# Patient Record
Sex: FEMALE | Race: WHITE | Employment: FULL TIME | URBAN - METROPOLITAN AREA
[De-identification: names, ages, dates, MRNs, and addresses within clinical notes are randomized per-mention and may not be internally consistent; named-entity substitution may affect disease eponyms.]

---

## 2017-05-22 ENCOUNTER — HOSPITAL ENCOUNTER (OUTPATIENT)
Dept: MAMMOGRAPHY | Age: 52
Discharge: HOME | End: 2017-05-22
Attending: OBSTETRICS & GYNECOLOGY | Admitting: OBSTETRICS & GYNECOLOGY

## 2017-05-22 DIAGNOSIS — Z12.31 ENCOUNTER FOR SCREENING MAMMOGRAM FOR BREAST CANCER: ICD-10-CM

## 2022-05-04 ENCOUNTER — TELEPHONE (OUTPATIENT)
Dept: SURGERY | Age: 57
End: 2022-05-04

## 2022-05-04 NOTE — TELEPHONE ENCOUNTER
Tried to call this patient for a New Patient ash at [phone] says a name that is no one on her contact list the other number [phone] number states the mail box is full and you can not leave a message. I will try to attempt to call Dr. Sonu Almendarez tomorrow and see if they have any other numbers.

## 2022-05-04 NOTE — TELEPHONE ENCOUNTER
I actually found a number that worked for the patient and I hope she gives us a call back by tomorrow if she cant for some reason I will call her again.

## 2022-05-05 NOTE — PROGRESS NOTES
Fredi Pillai MD  Surgical Oncology  Date of service: 5/10/2022  Augusto Rdz    Reason for Consult: Dr Christel Joseph asked me to see Milan Villanueva for evaluation of melanoma in situ of the left breast.    Present Illness: Patient is a 65 y.o.  female presents to her dermatologist with a mole on the left breast. The lesion was very small in size, smaller than the tip of a pencil, and has been present for at least two years. It has recently not significantly changed in size. It has not been bleeding. The lesion has not been pruritic. Patient does not have any other lesions of concern. Biopsy of the lesion was positive for melanoma. Patient does have a personal  history of melanoma (lower limb, including hip ([address] 2002) and Right Leg; feb 2019 melanoma in-situ Right posterior calf. Beka Burger does not have significant subcutaneous mass, swelling in the neck or axilla, cough, abdominal pain, jaundice, blood in stools, headaches, recent neurological changes or bone pain to indicate any metastatic disease. Mother had Ovarian Cancer. Father had possible Lymphoma, Aunt had bone cancer. Past Medical History:   Diagnosis Date    Anxiety      History reviewed. No pertinent surgical history. Social  Social History     Tobacco Use    Smoking status: Never Smoker    Smokeless tobacco: Never Used   Substance Use Topics    Alcohol use: Yes     Comment: social    Drug use: Never     History reviewed. No pertinent family history. Allergies: Patient has no known allergies. Current Outpatient Medications   Medication Sig Dispense Refill    fluticasone (FLONASE) 50 MCG/ACT nasal spray 1 spray by Nasal route daily. No current facility-administered medications for this visit. Review of Systems: See HPI, all other systems reviewed and are negative.   Physical Examination:  /81   Pulse 62   Temp 98.2 °F (36.8 °C)   Ht 5' 8\" (1.727 m)   Wt 144 lb (65.3 kg)   LMP  (LMP Unknown)   Breastfeeding No BMI 21.90 kg/m²   General:  Alert, oriented x 3, cooperative, no distress, appears stated age. Skin: Skin color, texture, turgor normal. There is a biopsy site on her Left Breast. There are no surrounding areas of nodularity or pigmentation. Other suspicious skin lesions: no.   Lymph nodes: Cervical, supraclavicular, and axillary nodes normal.   HENT:  Normocephalic, without obvious abnormality. Moist mucus membranes. No icterus. Neck: Supple, symmetrical, trachea midline, no thyromegaly. Back:   Symmetric, no curvature. No CVA tenderness. Lungs:   No respiratory distress. Chest wall:  No tenderness or deformity. Heart:  Regular rate and rhythm by peripheral pulses. Abdomen:   Soft, non-tender. No masses,  No organomegaly. Extremities: Extremities normal, atraumatic, no cyanosis or edema. Neurologic: Grossly intact sensory and motor exam.   Psychiatric: Appropriate mood and thought process     Pathology: Early Evolving Malignant Melanoma In Situ Of The Left Breast  Plan: Pathology reviewed with the patient. The natural history, prognosis and treatment of melanoma were discussed. Dorita Hankins appears to have melanoma in situ. We discussed wide local excision and sentinel node biopsy as well as the appropriate applications. These discussions were illustrated with diagrams and the patient was given a printed handout that summarizes the complete discussion. The lesion can be treated with: Wide Excision under MAC/Local anesthesia. I explained patient about the surgery. Discussed pros and cons of proceeding with excision in the office versus in the hospital. Given the location, it may be a better idea to proceed with OR. All the complications including wound issues were explained. Risks, benefits and alternatives of surgery explained to the patient and patient wishes to proceed with surgery. Adjuvant management will depend on final pathology.  All the questions of the patient are answered to her apparent satisfaction. Patient verbalized understanding of the management plan. Recommend proceeding with excision after the biopsy site has fully healed. Explained expected course of recovery and appropriate post-op activities/restrictions. Recommend allowing site to heal and then proceeding with surgery sometime in the next 4-6 weeks. Discussed importance of continued monitoring of self and routine follow up with Dr. Deondre Maceod. IAntonio DNP, ALEXANDRE, am scribing for an in the presence of Dr. David Drummond. 5/10/2022, 1:10 PM     I, Dr. David Drummond, personally performed the services described in this documentation as scribed by Antonio Hand DNP, ALEXANDRE, in my presence, and it is both accurate and complete.       David Drummond MD  Surgery Attending

## 2022-05-10 ENCOUNTER — OFFICE VISIT (OUTPATIENT)
Dept: SURGERY | Age: 57
End: 2022-05-10
Payer: COMMERCIAL

## 2022-05-10 VITALS
WEIGHT: 144 LBS | DIASTOLIC BLOOD PRESSURE: 81 MMHG | SYSTOLIC BLOOD PRESSURE: 125 MMHG | TEMPERATURE: 98.2 F | BODY MASS INDEX: 21.82 KG/M2 | HEART RATE: 62 BPM | HEIGHT: 68 IN

## 2022-05-10 DIAGNOSIS — D03.52 MELANOMA IN SITU OF LEFT BREAST (HCC): Primary | ICD-10-CM

## 2022-05-10 PROCEDURE — 99203 OFFICE O/P NEW LOW 30 MIN: CPT | Performed by: SURGERY

## 2022-05-10 NOTE — LETTER
[facility]) Surgical Oncology and General Surgery   [address] [place] [address] [phone]   [phone] MD Mart    SURGERY ORDER -- 22      Facility:  [facility] # _________________                                  Scheduled by: ____________    Surgery Date & Time: Wednesday May 25th 2:45                                            Nuc Med / Inj. - or - Needle/Seed Loc:                    Pt arrival: 12:25     Patient Name: Aide Emmanuel             :                1965           PCP:                 Torin Newsome MD   Home Ph:         [phone] (home)                                                     PROCEDURE:  Wide excision of left breast melanoma: 48644    DIAGNOSIS:     ICD-10-CM    1. Melanoma in situ of left breast ([facility]  D03.52      Anesthesia: _MAC_ Time Needed: _45 mins_Pt Position: _supine_         Outpatient _Y_ Admit __  Assist._____  Pre-Op H & P to be done by: DOS      Labs Needed: CBC [] PT/PTT []  INR [] CMP [] EKG [] CXR [] Urine Hcg []     Cardiac Clearance ___  (if Shagufta Jeny to be done by) __________________    Medications to be stopped 5 days before surgery:  None    Additional/Special Orders:    Ancef 2gm IV satinder Garcia MD  2022 12:15 PM

## 2022-05-17 ENCOUNTER — TELEPHONE (OUTPATIENT)
Dept: SURGERY | Age: 57
End: 2022-05-17

## 2022-05-17 NOTE — TELEPHONE ENCOUNTER
Patient called in wanting to go over dates for her procedure    Please contact the patient at [phone]

## 2022-05-18 NOTE — TELEPHONE ENCOUNTER
Will discuss potential surgery dates with Dr. Zoraida Fraire and inform patient. At last appointment discussed potentially the 23rd.

## 2022-05-19 ENCOUNTER — PREP FOR PROCEDURE (OUTPATIENT)
Dept: SURGERY | Age: 57
End: 2022-05-19

## 2022-05-20 ENCOUNTER — TELEPHONE (OUTPATIENT)
Dept: SURGERY | Age: 57
End: 2022-05-20

## 2022-05-20 NOTE — TELEPHONE ENCOUNTER
Patient verbalized an understanding od date, Wednesday May 2th, with an arrival time of 12:30 and all pre-op instructions.

## 2022-05-23 NOTE — PROGRESS NOTES
Place patient label inside box (if no patient label, complete below)  Name:  :  MR#:   Niesha Angles / PROCEDURE  1. I (we), Zully Blackman (Patient Name) authorize Nell Navarro MD (Provider / Mary Ellen Patterson) and/or such assistants as may be selected by him/her, to perform the following operation/procedure(s): WIDE EXCISION OF LEFT BREAST MELANOMA        Note: If unable to obtain consent prior to an emergent procedure, document the emergent reason in the medical record. This procedure has been explained to my (our) satisfaction and included in the explanation was:  A) The intended benefit, nature, and extent of the procedure to be performed;  B) The significant risks involved and the probability of success;  C) Alternative procedures and methods of treatment;  D) The dangers and probable consequences of such alternatives (including no procedure or treatment); E) The expected consequences of the procedure on my future health;  F) Whether other qualified individuals would be performing important surgical tasks and/or whether  would be present to advise or support the procedure. I (we) understand that there are other risks of infection and other serious complications in the pre-operative/procedural and postoperative/procedural stages of my (our) care. I (we) have asked all of the questions which I (we) thought were important in deciding whether or not to undergo treatment or diagnosis. These questions have been answered to my (our) satisfaction. I (we) understand that no assurance can be given that the procedure will be a success, and no guarantee or warranty of success has been given to me (us).     2. It has been explained to me (us) that during the course of the operation/procedure, unforeseen conditions may be revealed that necessitate extension of the original procedure(s) or different procedure(s) than those set forth in Paragraph 1. I (we) authorize and request that the above-named physician, his/her assistants or his/her designees, perform procedures as necessary and desirable if deemed to be in my (our) best interest.     Revised 8/2/2021                                                                          Page 1 of 2                    3. I acknowledge that health care personnel may be observing this procedure for the purpose of medical education or other specified purposes as may be necessary as requested and/or approved by my (our) physician. 4. I (we) consent to the disposal by the hospital Pathologist of the removed tissue, parts or organs in accordance with hospital policy. 5. I do ____ do not ____ consent to the use of a local infiltration pain blocking agent that will be used by my provider/surgical provider to help alleviate pain during my procedure. 6. I do ____ do not ____ consent to an emergent blood transfusion in the case of a life-threatening situation that requires blood components to be administered. This consent is valid for 24 hours from the beginning of the procedure. 7. This patient does ____ or does not ____ currently have a DNR status/order. If DNR order is in place, obtain Addendum to the Surgical Consent for ALL Patients with a DNR Order to address cameron-operative status for limited intervention or DNR suspension.      8. I have read and fully understand the above Consent for Operation/Procedure and that all blanks were completed before I signed the consent.   _____________________________       _____________________      ____/____am/pm  Signature of Patient or legal representative      Printed Name / Relationship            Date / Time   ____________________________       _____________________      ____/____am/pm  Witness to Signature                                    Printed Name                    Date / Time     If patient is unable to sign or is a minor, complete the following)  Patient is a minor, ____ years of age, or unable to sign because:   ______________________________________________________________________________________________    Marie Herrmann If a phone consent is obtained, consent will be documented by using two health care professionals, each affirming that the consenting party has no questions and gives consent for the procedure discussed with the physician/provider.   _____________________          ____________________       _____/_____am/pm   2nd witness to phone consent        Printed name           Date / Time    Informed Consent:  I have provided the explanation described above in section 1 to the patient and/or legal representative.  I have provided the patient and/or legal representative with an opportunity to ask any questions about the proposed operation/procedure.   ___________________________          ____________________         ____/____am/pm  Provider / Proceduralist                            Printed name            Date / Time  Revised 8/2/2021                                                                      Page 2 of 2

## 2022-05-23 NOTE — PROGRESS NOTES
[facility] PRE-SURGICAL TESTING INSTRUCTIONS                                  PRIOR TO PROCEDURE DATE:        1. PLEASE FOLLOW ANY  GUIDELINES/ INSTRUCTIONS PRIOR TO YOUR PROCEDURE AS ADVISED BY YOUR SURGEON. 2. Arrange for someone to drive you home and be with you for the first 24 hours after discharge for your safety after your procedure for which you received sedation. Ensure it is someone we can share information with regarding your discharge. 3. You must contact your surgeon for instructions IF:   You are taking any blood thinners, aspirin, anti-inflammatory or vitamin E.   There is a change in your physical condition such as a cold, fever, rash, cuts, sores or any other infection, especially near your surgical site. 4. Do not drink alcohol the day before or day of your procedure. 5. A Pre-op History and Physical for surgery MUST be completed by your Physician or Urgent Care within 30 days of your procedure date. Please bring a copy with you on the day of your procedure and along with any other testing performed. THE DAY OF YOUR PROCEDURE:  1. Follow instructions for ARRIVAL TIME as DIRECTED BY YOUR SURGEON. 2. Enter the MAIN entrance from [employer] and follow the signs to the free [employer] or [employer] parking (offered free of charge 6am-5pm). 3. Enter the Main Entrance of the hospital (do not enter from the lower level of the parking garage). Upon entrance, check in with the  at the main desk on your left. If no one is available at the desk, proceed into the [facility] Waiting Room and go through the door directly into the [facility]. There is a Check-in desk ACROSS from Room 5 (marked with a sign hanging from the ceiling). The phone number for the surgery center is [phone]. 4. Please call [phone] option #2 option #2 if you have not been preregistered yet.   On the day of your procedure bring your insurance card and photo ID. You will be registered at your bedside once brought back to your room. 5. DO NOT EAT ANYTHING eight hours prior to your arrival for surgery. May have 8 ounces of water 4 hours prior to your arrival for surgery. NOTE: ALL Gastric, Bariatric and Bowel surgery patients MUST follow their surgeon's instructions. 6. MEDICATIONS    Take the following medications with a SMALL sip of water: NONE   Bariatric patient's call surgeon if on diabetic medications as some need to be stopped 1 week preop   Use your usual dose of inhalers the morning of surgery. BRING your rescue inhaler with you to hospital.    Anesthesia does NOT want you to take insulin the morning of surgery. They will control your blood sugar while you are at the hospital. Please contact your ordering physician for instructions regarding your insulin the night before your procedure. If you have an insulin pump, please keep it set on basal rate. 7. Do not swallow water when brushing teeth. No gum, candy, mints or ice chips. Refrain from smoking or at least decrease the amount. 8. Dress in loose, comfortable clothing appropriate for redressing after your procedure. Do not wear jewelry (including body piercings), make-up (especially NO eye make-up), fingernail polish (NO toenail polish if foot/leg surgery), lotion, powders or metal hairclips. 9. Dentures, glasses, or contacts will need to be removed before your procedure. Bring cases for your glasses, contacts, dentures, or hearing aids to protect them while you are in surgery. 10. If you use a CPAP, please bring it with you on the day of your procedure. 11. We recommend that valuable personal  belongings such as cash, cell phones, e-tablets or jewelry, be left at home during your stay. The hospital will not be responsible for valuables that are not secured in the hospital safe.  However, if your insurance requires a co-pay, you may want to bring a method of payment, i.e. Check or credit card, if you wish to pay your co-pay the day of surgery. 12. If you are to stay overnight, you may bring a bag with personal items. Please have any large items you may need brought in by your family after your arrival to your hospital room. 15. If you have a Living Will or Durable Power of , please bring a copy on the day of your procedure. 15. With your permission, one family member may accompany you while you are being prepared for surgery. Once you are ready, additional family members may join you. HOW WE KEEP YOU SAFE and WORK TO PREVENT SURGICAL SITE INFECTIONS:  1. Health care workers should always check your ID bracelet to verify your name and birth date. You will be asked many times to state your name, date of birth, and allergies. 2. Health care workers should always clean their hands with soap or alcohol gel before providing care to you. It is okay to ask anyone if they cleaned their hands before they touch you. 3. You will be actively involved in verifying the type of procedure you are having and ensuring the correct surgical site. This will be confirmed multiple times prior to your procedure. Do NOT dwayne your surgery site UNLESS instructed to by your surgeon. 4. Do not shave or wax for 72 hours prior to procedure near your operative site. Shaving with a razor can irritate your skin and make it easier to develop an infection. On the day of your procedure, any hair that needs to be removed near the surgical site will be clipped by a healthcare worker using a special clippers designed to avoid skin irritation. 5. When you are in the operating room, your surgical site will be cleansed with a special soap, and in most cases, you will be given an antibiotic before the surgery begins. What to expect AFTER YOUR PROCEDURE:  1. Immediately following your procedure, your will be taken to the PACU for the first phase of your recovery.   Your nurse will help you recover from any potential side effects of anesthesia, such as extreme drowsiness, changes in your vital signs or breathing patterns. Nausea, headache, muscle aches, or sore throat may also occur after anesthesia. Your nurse will help you manage these potential side effects. 2. For comfort and safety, arrange to have someone at home with you for the first 24 hours after discharge. 3. You and your family will be given written instructions about your diet, activity, dressing care, medications, and return visits. 4. Once at home, should issues with nausea, pain, or bleeding occur, or should you notice any signs of infection, you should call your surgeon. 5. Always clean your hands before and after caring for your wound. Do not let your family touch your surgery site without cleaning their hands. 6. Narcotic pain medications can cause significant constipation. You may want to add a stool softener to your postoperative medication schedule or speak to your surgeon on how best to manage this SIDE EFFECT. SPECIAL INSTRUCTIONS None    Thank you for allowing us to care for you. We strive to exceed your expectations in the delivery of care and service provided to you and your family. If you need to contact the [place] [address] staff for any reason, please call us at [phone]    Instructions reviewed with patient during preadmission testing phone interview.   Sim العلي RN.5/23/2022 .12:29 PM      ADDITIONAL EDUCATIONAL INFORMATION REVIEWED PER PHONE WITH YOU AND/OR YOUR FAMILY:  Yes Hibiclens® Bathing Instructions   Yes Antibacterial Soap

## 2022-05-25 ENCOUNTER — ANESTHESIA EVENT (OUTPATIENT)
Dept: OPERATING ROOM | Age: 57
End: 2022-05-25
Payer: COMMERCIAL

## 2022-05-25 ENCOUNTER — ANESTHESIA (OUTPATIENT)
Dept: OPERATING ROOM | Age: 57
End: 2022-05-25
Payer: COMMERCIAL

## 2022-05-25 ENCOUNTER — HOSPITAL ENCOUNTER (OUTPATIENT)
Age: 57
Setting detail: OUTPATIENT SURGERY
Discharge: HOME | End: 2022-05-25
Attending: SURGERY | Admitting: SURGERY
Payer: COMMERCIAL

## 2022-05-25 VITALS
OXYGEN SATURATION: 99 % | HEIGHT: 68 IN | BODY MASS INDEX: 21.19 KG/M2 | HEART RATE: 54 BPM | DIASTOLIC BLOOD PRESSURE: 74 MMHG | WEIGHT: 139.8 LBS | TEMPERATURE: 98.1 F | SYSTOLIC BLOOD PRESSURE: 124 MMHG | RESPIRATION RATE: 13 BRPM

## 2022-05-25 DIAGNOSIS — D03.52 MELANOMA IN SITU OF LEFT BREAST (HCC): ICD-10-CM

## 2022-05-25 PROCEDURE — 6360000002 HC RX W HCPCS: Performed by: NURSE ANESTHETIST, CERTIFIED REGISTERED

## 2022-05-25 PROCEDURE — 2500000003 HC RX 250 WO HCPCS: Performed by: NURSE ANESTHETIST, CERTIFIED REGISTERED

## 2022-05-25 PROCEDURE — 13101 CMPLX RPR TRUNK 2.6-7.5 CM: CPT | Performed by: SURGERY

## 2022-05-25 PROCEDURE — 2580000003 HC RX 258: Performed by: NURSE PRACTITIONER

## 2022-05-25 PROCEDURE — 88305 TISSUE EXAM BY PATHOLOGIST: CPT

## 2022-05-25 PROCEDURE — 2580000003 HC RX 258: Performed by: ANESTHESIOLOGY

## 2022-05-25 PROCEDURE — 7100000000 HC PACU RECOVERY - FIRST 15 MIN: Performed by: SURGERY

## 2022-05-25 PROCEDURE — 3600000002 HC SURGERY LEVEL 2 BASE: Performed by: SURGERY

## 2022-05-25 PROCEDURE — 3700000000 HC ANESTHESIA ATTENDED CARE: Performed by: SURGERY

## 2022-05-25 PROCEDURE — 6360000002 HC RX W HCPCS: Performed by: NURSE PRACTITIONER

## 2022-05-25 PROCEDURE — 6370000000 HC RX 637 (ALT 250 FOR IP): Performed by: FAMILY MEDICINE

## 2022-05-25 PROCEDURE — 3600000012 HC SURGERY LEVEL 2 ADDTL 15MIN: Performed by: SURGERY

## 2022-05-25 PROCEDURE — 7100000011 HC PHASE II RECOVERY - ADDTL 15 MIN: Performed by: SURGERY

## 2022-05-25 PROCEDURE — 11602 EXC TR-EXT MAL+MARG 1.1-2 CM: CPT | Performed by: SURGERY

## 2022-05-25 PROCEDURE — 3700000001 HC ADD 15 MINUTES (ANESTHESIA): Performed by: SURGERY

## 2022-05-25 PROCEDURE — 2580000003 HC RX 258: Performed by: SURGERY

## 2022-05-25 PROCEDURE — A4217 STERILE WATER/SALINE, 500 ML: HCPCS | Performed by: SURGERY

## 2022-05-25 PROCEDURE — 2709999900 HC NON-CHARGEABLE SUPPLY: Performed by: SURGERY

## 2022-05-25 PROCEDURE — 2500000003 HC RX 250 WO HCPCS: Performed by: SURGERY

## 2022-05-25 PROCEDURE — 7100000010 HC PHASE II RECOVERY - FIRST 15 MIN: Performed by: SURGERY

## 2022-05-25 PROCEDURE — 7100000001 HC PACU RECOVERY - ADDTL 15 MIN: Performed by: SURGERY

## 2022-05-25 RX ORDER — KETOROLAC TROMETHAMINE 30 MG/ML
INJECTION, SOLUTION INTRAMUSCULAR; INTRAVENOUS PRN
Status: DISCONTINUED | OUTPATIENT
Start: 2022-05-25 | End: 2022-05-25 | Stop reason: SDUPTHER

## 2022-05-25 RX ORDER — SODIUM CHLORIDE 9 MG/ML
25 INJECTION, SOLUTION INTRAVENOUS PRN
Status: DISCONTINUED | OUTPATIENT
Start: 2022-05-25 | End: 2022-05-25 | Stop reason: HOSPADM

## 2022-05-25 RX ORDER — ONDANSETRON 2 MG/ML
INJECTION INTRAMUSCULAR; INTRAVENOUS PRN
Status: DISCONTINUED | OUTPATIENT
Start: 2022-05-25 | End: 2022-05-25 | Stop reason: SDUPTHER

## 2022-05-25 RX ORDER — KETAMINE HYDROCHLORIDE 50 MG/ML
INJECTION, SOLUTION, CONCENTRATE INTRAMUSCULAR; INTRAVENOUS PRN
Status: DISCONTINUED | OUTPATIENT
Start: 2022-05-25 | End: 2022-05-25 | Stop reason: SDUPTHER

## 2022-05-25 RX ORDER — FENTANYL CITRATE 50 UG/ML
INJECTION, SOLUTION INTRAMUSCULAR; INTRAVENOUS PRN
Status: DISCONTINUED | OUTPATIENT
Start: 2022-05-25 | End: 2022-05-25 | Stop reason: SDUPTHER

## 2022-05-25 RX ORDER — DIPHENHYDRAMINE HYDROCHLORIDE 50 MG/ML
12.5 INJECTION INTRAMUSCULAR; INTRAVENOUS
Status: DISCONTINUED | OUTPATIENT
Start: 2022-05-25 | End: 2022-05-25 | Stop reason: HOSPADM

## 2022-05-25 RX ORDER — SODIUM CHLORIDE 0.9 % (FLUSH) 0.9 %
5-40 SYRINGE (ML) INJECTION EVERY 12 HOURS SCHEDULED
Status: DISCONTINUED | OUTPATIENT
Start: 2022-05-25 | End: 2022-05-25 | Stop reason: HOSPADM

## 2022-05-25 RX ORDER — LIDOCAINE HYDROCHLORIDE 10 MG/ML
1 INJECTION, SOLUTION EPIDURAL; INFILTRATION; INTRACAUDAL; PERINEURAL
Status: DISCONTINUED | OUTPATIENT
Start: 2022-05-25 | End: 2022-05-25 | Stop reason: HOSPADM

## 2022-05-25 RX ORDER — PROCHLORPERAZINE EDISYLATE 5 MG/ML
5 INJECTION INTRAMUSCULAR; INTRAVENOUS
Status: DISCONTINUED | OUTPATIENT
Start: 2022-05-25 | End: 2022-05-25 | Stop reason: HOSPADM

## 2022-05-25 RX ORDER — PROPOFOL 10 MG/ML
INJECTION, EMULSION INTRAVENOUS CONTINUOUS PRN
Status: DISCONTINUED | OUTPATIENT
Start: 2022-05-25 | End: 2022-05-25 | Stop reason: SDUPTHER

## 2022-05-25 RX ORDER — SODIUM CHLORIDE 0.9 % (FLUSH) 0.9 %
5-40 SYRINGE (ML) INJECTION PRN
Status: DISCONTINUED | OUTPATIENT
Start: 2022-05-25 | End: 2022-05-25 | Stop reason: HOSPADM

## 2022-05-25 RX ORDER — ONDANSETRON 2 MG/ML
4 INJECTION INTRAMUSCULAR; INTRAVENOUS
Status: DISCONTINUED | OUTPATIENT
Start: 2022-05-25 | End: 2022-05-25 | Stop reason: HOSPADM

## 2022-05-25 RX ORDER — SODIUM CHLORIDE, SODIUM LACTATE, POTASSIUM CHLORIDE, CALCIUM CHLORIDE 600; 310; 30; 20 MG/100ML; MG/100ML; MG/100ML; MG/100ML
INJECTION, SOLUTION INTRAVENOUS CONTINUOUS
Status: DISCONTINUED | OUTPATIENT
Start: 2022-05-25 | End: 2022-05-25 | Stop reason: HOSPADM

## 2022-05-25 RX ORDER — LORAZEPAM 2 MG/ML
0.5 INJECTION INTRAMUSCULAR
Status: DISCONTINUED | OUTPATIENT
Start: 2022-05-25 | End: 2022-05-25 | Stop reason: HOSPADM

## 2022-05-25 RX ORDER — MIDAZOLAM HYDROCHLORIDE 1 MG/ML
INJECTION INTRAMUSCULAR; INTRAVENOUS PRN
Status: DISCONTINUED | OUTPATIENT
Start: 2022-05-25 | End: 2022-05-25 | Stop reason: SDUPTHER

## 2022-05-25 RX ORDER — LABETALOL HYDROCHLORIDE 5 MG/ML
10 INJECTION, SOLUTION INTRAVENOUS
Status: DISCONTINUED | OUTPATIENT
Start: 2022-05-25 | End: 2022-05-25 | Stop reason: HOSPADM

## 2022-05-25 RX ORDER — OXYCODONE HYDROCHLORIDE 5 MG/1
5 TABLET ORAL PRN
Status: COMPLETED | OUTPATIENT
Start: 2022-05-25 | End: 2022-05-25

## 2022-05-25 RX ORDER — BUPIVACAINE HYDROCHLORIDE AND EPINEPHRINE 5; 5 MG/ML; UG/ML
INJECTION, SOLUTION EPIDURAL; INTRACAUDAL; PERINEURAL PRN
Status: DISCONTINUED | OUTPATIENT
Start: 2022-05-25 | End: 2022-05-25 | Stop reason: HOSPADM

## 2022-05-25 RX ORDER — PROPOFOL 10 MG/ML
INJECTION, EMULSION INTRAVENOUS PRN
Status: DISCONTINUED | OUTPATIENT
Start: 2022-05-25 | End: 2022-05-25 | Stop reason: SDUPTHER

## 2022-05-25 RX ORDER — LIDOCAINE HYDROCHLORIDE 20 MG/ML
INJECTION, SOLUTION INFILTRATION; PERINEURAL PRN
Status: DISCONTINUED | OUTPATIENT
Start: 2022-05-25 | End: 2022-05-25 | Stop reason: SDUPTHER

## 2022-05-25 RX ORDER — SODIUM CHLORIDE 9 MG/ML
INJECTION, SOLUTION INTRAVENOUS CONTINUOUS
Status: DISCONTINUED | OUTPATIENT
Start: 2022-05-25 | End: 2022-05-25 | Stop reason: HOSPADM

## 2022-05-25 RX ORDER — MAGNESIUM HYDROXIDE 1200 MG/15ML
LIQUID ORAL CONTINUOUS PRN
Status: DISCONTINUED | OUTPATIENT
Start: 2022-05-25 | End: 2022-05-25 | Stop reason: HOSPADM

## 2022-05-25 RX ORDER — OXYCODONE HYDROCHLORIDE 5 MG/1
10 TABLET ORAL PRN
Status: COMPLETED | OUTPATIENT
Start: 2022-05-25 | End: 2022-05-25

## 2022-05-25 RX ORDER — FENTANYL CITRATE 50 UG/ML
25 INJECTION, SOLUTION INTRAMUSCULAR; INTRAVENOUS EVERY 5 MIN PRN
Status: DISCONTINUED | OUTPATIENT
Start: 2022-05-25 | End: 2022-05-25 | Stop reason: HOSPADM

## 2022-05-25 RX ORDER — SODIUM CHLORIDE 9 MG/ML
INJECTION, SOLUTION INTRAVENOUS PRN
Status: DISCONTINUED | OUTPATIENT
Start: 2022-05-25 | End: 2022-05-25 | Stop reason: HOSPADM

## 2022-05-25 RX ORDER — OXYCODONE HYDROCHLORIDE 5 MG/1
5 TABLET ORAL EVERY 6 HOURS PRN
Qty: 20 TABLET | Refills: 0 | Status: SHIPPED | OUTPATIENT
Start: 2022-05-25 | End: 2022-05-27

## 2022-05-25 RX ADMIN — ONDANSETRON 6 MG: 2 INJECTION INTRAMUSCULAR; INTRAVENOUS at 16:24

## 2022-05-25 RX ADMIN — KETAMINE HYDROCHLORIDE 30 MG: 50 INJECTION, SOLUTION INTRAMUSCULAR; INTRAVENOUS at 16:29

## 2022-05-25 RX ADMIN — OXYCODONE 5 MG: 5 TABLET ORAL at 17:53

## 2022-05-25 RX ADMIN — SODIUM CHLORIDE, POTASSIUM CHLORIDE, SODIUM LACTATE AND CALCIUM CHLORIDE: 600; 310; 30; 20 INJECTION, SOLUTION INTRAVENOUS at 13:23

## 2022-05-25 RX ADMIN — LIDOCAINE HYDROCHLORIDE 50 MG: 20 INJECTION, SOLUTION INFILTRATION; PERINEURAL at 16:30

## 2022-05-25 RX ADMIN — PROPOFOL 25 MG: 10 INJECTION, EMULSION INTRAVENOUS at 16:29

## 2022-05-25 RX ADMIN — MIDAZOLAM HYDROCHLORIDE 2 MG: 2 INJECTION, SOLUTION INTRAMUSCULAR; INTRAVENOUS at 16:24

## 2022-05-25 RX ADMIN — KETOROLAC TROMETHAMINE 30 MG: 30 INJECTION, SOLUTION INTRAMUSCULAR at 16:22

## 2022-05-25 RX ADMIN — PROPOFOL 150 MCG/KG/MIN: 10 INJECTION, EMULSION INTRAVENOUS at 16:29

## 2022-05-25 RX ADMIN — FENTANYL CITRATE 20 MCG: 50 INJECTION, SOLUTION INTRAMUSCULAR; INTRAVENOUS at 16:33

## 2022-05-25 RX ADMIN — SODIUM CHLORIDE, POTASSIUM CHLORIDE, SODIUM LACTATE AND CALCIUM CHLORIDE: 600; 310; 30; 20 INJECTION, SOLUTION INTRAVENOUS at 16:55

## 2022-05-25 RX ADMIN — CEFAZOLIN 2000 MG: 2 INJECTION, POWDER, FOR SOLUTION INTRAMUSCULAR; INTRAVENOUS at 16:24

## 2022-05-25 RX ADMIN — FENTANYL CITRATE 20 MCG: 50 INJECTION, SOLUTION INTRAMUSCULAR; INTRAVENOUS at 16:35

## 2022-05-25 ASSESSMENT — PAIN SCALES - GENERAL
PAINLEVEL_OUTOF10: 2
PAINLEVEL_OUTOF10: 0
PAINLEVEL_OUTOF10: 2
PAINLEVEL_OUTOF10: 4

## 2022-05-25 ASSESSMENT — PAIN DESCRIPTION - DESCRIPTORS: DESCRIPTORS: SORE

## 2022-05-25 ASSESSMENT — PAIN - FUNCTIONAL ASSESSMENT: PAIN_FUNCTIONAL_ASSESSMENT: 0-10

## 2022-05-25 ASSESSMENT — PAIN DESCRIPTION - ORIENTATION: ORIENTATION: LEFT

## 2022-05-25 ASSESSMENT — PAIN DESCRIPTION - LOCATION: LOCATION: BREAST

## 2022-05-25 ASSESSMENT — PAIN DESCRIPTION - FREQUENCY: FREQUENCY: CONTINUOUS

## 2022-05-25 ASSESSMENT — PAIN DESCRIPTION - PAIN TYPE: TYPE: ACUTE PAIN;SURGICAL PAIN

## 2022-05-25 ASSESSMENT — PAIN DESCRIPTION - ONSET: ONSET: PROGRESSIVE

## 2022-05-25 NOTE — ANESTHESIA PRE PROCEDURE
Department of Anesthesiology  Preprocedure Note       Name:  Re Perez   Age:  65 y.o.  :  1965                                          MRN:  [id number]         Date:  2022      Surgeon: Pravin Buckner):  Lisa Nolan MD    Procedure: Procedure(s):  WIDE EXCISION OF LEFT BREAST MELANOMA    Medications prior to admission:   Prior to Admission medications    Medication Sig Start Date End Date Taking? Authorizing Provider   fluticasone (FLONASE) 50 MCG/ACT nasal spray 1 spray by Nasal route daily. Historical Provider, MD       Current medications:    No current facility-administered medications for this encounter. Allergies:  No Known Allergies    Problem List:  There is no problem list on file for this patient. Past Medical History:        Diagnosis Date    Anxiety        Past Surgical History:        Procedure Laterality Date     SECTION      HERNIA REPAIR         Social History:    Social History     Tobacco Use    Smoking status: Never Smoker    Smokeless tobacco: Never Used   Substance Use Topics    Alcohol use: Yes     Comment: social                                Counseling given: Not Answered      Vital Signs (Current):   Vitals:    22 1226   Weight: 140 lb (63.5 kg)   Height: 5' 8\" (1.727 m)                                              BP Readings from Last 3 Encounters:   05/10/22 125/81       NPO Status:                                                                                 BMI:   Wt Readings from Last 3 Encounters:   22 140 lb (63.5 kg)   05/10/22 144 lb (65.3 kg)     Body mass index is 21.29 kg/m². CBC: No results found for: WBC, RBC, HGB, HCT, MCV, RDW, PLT    CMP: No results found for: NA, K, CL, CO2, BUN, CREATININE, GFRAA, AGRATIO, LABGLOM, GLUCOSE, GLU, PROT, CALCIUM, BILITOT, ALKPHOS, AST, ALT    POC Tests: No results for input(s): POCGLU, POCNA, POCK, POCCL, POCBUN, POCHEMO, POCHCT in the last 72 hours.     Coags: No results found for: PROTIME, INR, APTT    HCG (If Applicable): No results found for: PREGTESTUR, PREGSERUM, HCG, HCGQUANT     ABGs: No results found for: PHART, PO2ART, XGM3AOH, DAU3RES, BEART, P5JYLSLT     Type & Screen (If Applicable):  No results found for: LABABO, LABRH    Drug/Infectious Status (If Applicable):  No results found for: HIV, HEPCAB    COVID-19 Screening (If Applicable): No results found for: COVID19        Anesthesia Evaluation    Airway: Mallampati: II  TM distance: >3 FB   Neck ROM: full  Mouth opening: > = 3 FB   Dental:          Pulmonary:                              Cardiovascular:            Rhythm: regular  Rate: normal                    Neuro/Psych:               GI/Hepatic/Renal:             Endo/Other:    (+) malignancy/cancer. Abdominal:             Vascular: Other Findings:           Anesthesia Plan      MAC     ASA 2       Induction: intravenous. Anesthetic plan and risks discussed with patient. Plan discussed with CRNA.                     Polo Byrnes MD   5/25/2022

## 2022-05-25 NOTE — ANESTHESIA POSTPROCEDURE EVALUATION
Department of Anesthesiology  Postprocedure Note    Patient: Kay Fernando  MRN: [id number]  YOB: 1965  Date of evaluation: 5/25/2022  Time:  7:10 PM     Procedure Summary     Date: 05/25/22 Room / Location: [address] [place] [address]    Anesthesia Start: 2184 Anesthesia Stop: 4251    Procedure: WIDE EXCISION OF LEFT BREAST MELANOMA (Left ) Diagnosis:       Melanoma in situ of left breast ([address]      (Melanoma in situ of left breast ([address] [D03.52])    Surgeons: Bradly Heart MD Responsible Provider: Hunter Gusman MD    Anesthesia Type: MAC ASA Status: 2          Anesthesia Type: No value filed. Ketty Phase I: Ketty Score: 10    Ketty Phase II: Ketty Score: 10    Last vitals: Reviewed and per EMR flowsheets.        Anesthesia Post Evaluation    Patient location during evaluation: PACU  Patient participation: complete - patient participated  Level of consciousness: awake and alert  Airway patency: patent  Nausea & Vomiting: no nausea and no vomiting  Complications: no  Cardiovascular status: hemodynamically stable  Respiratory status: acceptable  Hydration status: euvolemic  Multimodal analgesia pain management approach

## 2022-05-25 NOTE — BRIEF OP NOTE
Brief Postoperative Note      Patient: Ej Manley  YOB: 1965  MRN: [id number]    Date of Procedure: 5/25/2022    Pre-Op Diagnosis: Melanoma in situ of left breast ([address] [D03.52]    Post-Op Diagnosis: Same       Procedure(s):  WIDE EXCISION OF LEFT BREAST MELANOMA    Surgeon(s):  Lew Gilford, MD    Assistant:  Resident: Carmela Hawkins MD    Anesthesia: Monitor Anesthesia Care    Estimated Blood Loss (mL): Minimal    Complications: None    Specimens:   ID Type Source Tests Collected by Time Destination   A : A. LEFT BREAST MELANOMA (SHORT SUPERIOR, LONG MEDIAL) Tissue Tissue SURGICAL PATHOLOGY Lew Gilford, MD 5/25/2022 1646        Implants:  * No implants in log *      Drains: * No LDAs found *    Findings: melanoma in situ of the left breast ~ 2 cm from nipple, 5 o'clock was removed.      Electronically signed by Carmela Hawkins MD on 5/25/2022 at 4:57 PM

## 2022-05-25 NOTE — PROGRESS NOTES
Patient received from the OR to PACU #8 post WIDE EXCISION OF LEFT BREAST MELANOMA - Left of Dr. Maurice Troy. Placed on PACU monitoring equipment. Report given per CRNA and Dr. Christina Millard. Per report, patient was stable during the procedure. On arrival, patient is alert, angela and denies pain.

## 2022-05-25 NOTE — FLOWSHEET NOTE
Tolerating crackers, water and pain pill given. States is \"ready to go home\". Will prepare for discharge.

## 2022-05-25 NOTE — PROGRESS NOTES
Pt anxious but alert and oriented.  at bedside. IV placed and LR running. Ancef on hold at bedside. Consents signed and on chart.  is to take black bag. Clothing locked in locked room.

## 2022-05-25 NOTE — PROGRESS NOTES
Ambulatory Surgery/Procedure Discharge Note    Vitals:    05/25/22 1815   BP: 124/74   Pulse: 54   Resp: 13   Temp: 98.1 °F (36.7 °C)   SpO2: 99%       In: 1500 [P.O.:350; I.V.:1100]  Out: -     Restroom use offered before discharge. Yes, voided large amount into toilet with no issue    Pain assessment:  present - adequately treated, pain pill given prior to discharge  Pain Level: 2    Discharge instructions given to patient and her . They verbalize understanding. Patient discharged to home/self care. Patient discharged via wheel chair by this RN to waiting /private car. Patient's  has all patient belongings, copy of discharge instructions and written prescription.        5/25/2022 6:56 PM

## 2022-05-25 NOTE — H&P
Ej Manley    [id number]      Department of General Surgery    Surgical Services     Pre-operative History and Physical      INDICATION:   Melanoma in situ of left breast ([facility] [address] [D03.52]    Procedure(s):  WIDE EXCISION OF LEFT BREAST MELANOMA      History obtained from: Patient interview and EHR     HISTORY:   The patient is a 65 y.o. female with a past medical and surgical history as delineated below. Patient with biopsy demonstrated melanoma of the left breast presents today for the above procedure. Weight loss/gain:  No  Recent Hx Steroid use: No  Known anesthesia problems:  No  Recent history of abnormal bleeding: No  Remote history of abnormal bleeding:No  Illness Screening: Patient denies fever, chills, worsening cough      Past Medical History:        Diagnosis Date    Anxiety      Past Surgical History:        Procedure Laterality Date     SECTION      HERNIA REPAIR         Medications Prior to Admission:   Prior to Admission medications    Medication Sig Start Date End Date Taking? Authorizing Provider   fluticasone (FLONASE) 50 MCG/ACT nasal spray 1 spray by Nasal route daily. Historical Provider, MD       Allergies:  Patient has no known allergies.     REVIEW OF SYSTEMS:    Constitutional: Negative for chills, fatigue and fever   Respiratory: Negative for shortness of breath and wheezing    Cardiovascular: Negative for chest pain, palpitations and exertional chest pressure  Gastrointestinal: Negative for abdominal pain  Musculoskeletal: Negative for gait problem, and joint swelling    Skin: Negative for rash and nonhealing wounds   Neurological: Negative for dizziness, syncope, light-headedness    Hematological: Does not bruise/bleed easily   Psychiatric/Behavioral: Negative for agitation    PHYSICAL EXAM:      /85   Pulse 87   Temp 98.8 °F (37.1 °C) (Temporal)   Resp 14   Ht 5' 8\" (1.727 m)   Wt 139 lb 12.8 oz (63.4 kg)   LMP  (LMP Unknown)   SpO2 99%   BMI 21.26 kg/m²  I      Eyes:  pupils equal, round and reactive to light and conjunctiva normal    Head/ENT:  Normocephalic, without obvious abnormality, atramatic,     Neck:  Supple, symmetrical, trachea midline, no adenopathy, no tenderness, skin warm and dry. Heart: Regular rate and rhythm    Lungs:  No increased work of breathing, good air exchange, clear to auscultation bilaterally, no crackles or wheezing    Abdomen:  Soft, non-distended, non-tender, no masses palpated    Extremities:  No clubbing, cyanosis, or edema      ASSESSMENT AND PLAN:    1. Patient is a 65 y.o. female with above specified procedure planned. 2.  Access to ancillary services are available per request of the provider.     ALEXANDRE Reyes - CRESENCIO   5/25/2022

## 2022-06-01 NOTE — PROGRESS NOTES
[facility] SURGICAL ONCOLOGY  [address] [facility]  Dept: [phone]  Dept Fax: [phone]      Visit Date: 2022    Subjective: Prisca Burton is a 65 y.o. female here for postoperative visit after wide excision of left breast melanoma on 22. Reports she is feeling well today with no complications or concerns. Pleased with incision. No Known Allergies  Past Medical History:   Diagnosis Date    Anxiety      Past Surgical History:   Procedure Laterality Date     SECTION      CHEST WALL RESECTION Left 2022    WIDE EXCISION OF LEFT BREAST MELANOMA performed by Adry Reeder MD at [facility]         Objective:     /77   Pulse 52   Temp 97.7 °F (36.5 °C)   Ht 5' 8\" (1.727 m)   Wt 143 lb (64.9 kg)   LMP  (LMP Unknown)   BMI 21.74 kg/m²     General:  Alert, oriented x 3, cooperative. Skin: Skin color, texture, turgor normal. Left breast incision with edges well approximated, no erythema, nontender. No drainage. Lymph nodes: Cervical, supraclavicular, and axillary nodes normal.   HENT:  Normocephalic, without obvious abnormality. Moist mucus membranes. No icterus. Lungs: No respiratory distress. Heart:  Regular rate and rhythm. No murmur. Abdomen: Soft, non-tender. No masses,  No organomegaly. Extremities: Extremities normal, atraumatic, no cyanosis or edema. Neurologic: Grossly intact sensory and motor exam.   Psychiatric: Appropriate mood and thought process       Path: FINAL DIAGNOSIS:   Reexcision, left breast melanoma ( melanoma in situ by history ). Organizing skin scar with associated nonspecific reactive and   inflammatory changes. Small seborrheic keratosis. No evidence of residual atypical melanocytic proliferation identified. Resection margins negative for neoplasm. Assessment/Plan:   1. Melanoma in situ left breast: s/p excision 22. Path as above reviewed with patient. Uncomplicated post-operative course. Incision healing well with no issues. Reviewed symptoms of recurrence including nodularity at site. Pt will continue to follow with Dr. Poornima Gregg. Next appointment is in October. Can call us with any questions or concerns if she would like to be seen, otherwise follow-up PRN.          Electronically signed by ALEXANDRE Ramirez CNP on 6/7/2022 at 11:31 AM

## 2022-06-02 NOTE — OP NOTE
Operative Note      Patient: Elliott Boyd  YOB: 1965  MRN: [id number]    Date of Procedure: 5/25/2022    Pre-Op Diagnosis: Melanoma in situ of left breast ([address] [D03.52]    Post-Op Diagnosis: Same       Procedure(s):  WIDE EXCISION OF LEFT BREAST MELANOMA    Surgeon(s):  Charmayne Rusk, MD    Assistant:   Resident: Jennifer Mckeon MD    Anesthesia: Monitor Anesthesia Care    Estimated Blood Loss (mL): 2 ml    Complications: None    Specimens:   ID Type Source Tests Collected by Time Destination   A : A. LEFT BREAST MELANOMA (SHORT SUPERIOR, LONG MEDIAL) Tissue Tissue SURGICAL PATHOLOGY Charmayne Rusk, MD 5/25/2022 1646      INDICATIONS FOR OPERATION:  Discussed with patient about wide excision of the melanoma. The risks of surgery include infection, bleeding, and recurrence of the lesion were explained. Patient verbalized understanding of the risks and benefits and wishes to proceed. DETAILS OF PROCEDURE: Patient was identified in the preoperative area and brought to the operating room. Monitored anesthesia given. Operative site is prepped and draped in a sterile manner. Timeout procedure was performed confirming the procedure, site and administration of antibiotics. Lesion and margin marked for the excision. The lesion was measuring 0.5 cm x 0.4 cm. A margin of 0.5 cm was taken on all sides and it was converted into an eyeball shape to allow for the closure. Local anesthesia infiltrated. Skin incised with scalpel. Incision deepened to subcutaneous tissues. Electrocautery used for further division of tissues. Skin along with subcutaneous fat until fascia excised and oriented with sutures. Specimen sent to pathology. Hemostasis checked and achieved. Because the defect size, flaps are raised on all sides with diathermy. Flaps are raised above the fascia. The undermining of wound was more than the width of the wound.  The wound was closed in layers with interrupted 2-0 Vicryl subcutaneous and 3-0 Vicryl deep dermal sutures. Skin was closed with 3-0 vicryl sutures. Length of complex closure was 4.5 cm. Derma flex placed over the closed incision. The patient tolerated the procedure well. Patient was awakened and transferred to the recovery room uneventfully. I was present for the entire procedure.     Jonh Pizarro MD  Surgical Oncologist

## 2022-06-07 ENCOUNTER — OFFICE VISIT (OUTPATIENT)
Dept: SURGERY | Age: 57
End: 2022-06-07

## 2022-06-07 VITALS
BODY MASS INDEX: 21.67 KG/M2 | TEMPERATURE: 97.7 F | HEART RATE: 52 BPM | WEIGHT: 143 LBS | HEIGHT: 68 IN | DIASTOLIC BLOOD PRESSURE: 77 MMHG | SYSTOLIC BLOOD PRESSURE: 124 MMHG

## 2022-06-07 DIAGNOSIS — Z51.89 VISIT FOR WOUND CHECK: ICD-10-CM

## 2022-06-07 DIAGNOSIS — D03.52 MELANOMA IN SITU OF LEFT BREAST (HCC): Primary | ICD-10-CM

## 2022-06-07 DIAGNOSIS — Z48.89 POSTOPERATIVE VISIT: ICD-10-CM

## 2022-06-07 PROCEDURE — 99024 POSTOP FOLLOW-UP VISIT: CPT | Performed by: NURSE PRACTITIONER

## (undated) DEVICE — SUTURE VCRL SZ 3-0 L18IN ABSRB UD L26MM SH 1/2 CIR J864D

## (undated) DEVICE — GLOVE SURG SZ 7 L12IN FNGR THK75MIL WHT LTX POLYMER BEAD

## (undated) DEVICE — GENERAL: Brand: MEDLINE INDUSTRIES, INC.

## (undated) DEVICE — GLOVE SURG SZ 7 L12IN FNGR THK79MIL GRN LTX FREE

## (undated) DEVICE — CLEANER,CAUTERY TIP,2X2",STERILE: Brand: MEDLINE

## (undated) DEVICE — SUTURE VCRL SZ 2-0 L18IN ABSRB VLT L26MM SH 1/2 CIR J775D

## (undated) DEVICE — DRAPE,T,LAPARO,TRANS,STERILE: Brand: MEDLINE

## (undated) DEVICE — APPLICATOR MEDICATED 26 CC SOLUTION HI LT ORNG CHLORAPREP

## (undated) DEVICE — TOWEL,STOP FLAG GOLD N-W: Brand: MEDLINE

## (undated) DEVICE — ADHESIVE SKIN CLSR 0.7ML TOP DERMBND ADV

## (undated) DEVICE — E-Z CLEAN, NON-STICK, PTFE COATED, ELECTROSURGICAL BLADE ELECTRODE, MODIFIED EXTENDED INSULATION, 2.5 INCH (6.35 CM): Brand: MEGADYNE